# Patient Record
Sex: FEMALE
[De-identification: names, ages, dates, MRNs, and addresses within clinical notes are randomized per-mention and may not be internally consistent; named-entity substitution may affect disease eponyms.]

---

## 2023-04-17 ENCOUNTER — APPOINTMENT (OUTPATIENT)
Dept: OTOLARYNGOLOGY | Facility: CLINIC | Age: 33
End: 2023-04-17
Payer: COMMERCIAL

## 2023-04-17 VITALS — TEMPERATURE: 96.2 F | WEIGHT: 150 LBS | HEIGHT: 68 IN | BODY MASS INDEX: 22.73 KG/M2

## 2023-04-17 DIAGNOSIS — Z83.3 FAMILY HISTORY OF DIABETES MELLITUS: ICD-10-CM

## 2023-04-17 DIAGNOSIS — J31.0 CHRONIC RHINITIS: ICD-10-CM

## 2023-04-17 DIAGNOSIS — Z78.9 OTHER SPECIFIED HEALTH STATUS: ICD-10-CM

## 2023-04-17 DIAGNOSIS — Z80.3 FAMILY HISTORY OF MALIGNANT NEOPLASM OF BREAST: ICD-10-CM

## 2023-04-17 DIAGNOSIS — R09.81 NASAL CONGESTION: ICD-10-CM

## 2023-04-17 DIAGNOSIS — T48.5X5A CHRONIC RHINITIS: ICD-10-CM

## 2023-04-17 DIAGNOSIS — J34.3 HYPERTROPHY OF NASAL TURBINATES: ICD-10-CM

## 2023-04-17 PROCEDURE — 99204 OFFICE O/P NEW MOD 45 MIN: CPT | Mod: 25

## 2023-04-17 PROCEDURE — 31231 NASAL ENDOSCOPY DX: CPT

## 2023-04-18 NOTE — PHYSICAL EXAM
[Midline] : trachea located in midline position [Normal] : no rashes [Nasal Endoscopy Performed] : nasal endoscopy was performed, see procedure section for findings [de-identified] : + hypertrophied bilaterally

## 2023-04-18 NOTE — ASSESSMENT
[FreeTextEntry1] : 32 year old female presents with chronic nasal congestion and Afrin use x 3 years. Nasal endoscopy was limited, there was evidence of rhinitis medicamentosa with significant turbinate hypertrophy. Based on her history and exam findings, I am recommending stopping Afrin use and instead using nasal saline irrigation and nasal steroids for 3 months. Follow up at that time for repeat evaluation. Ultimately, patient may need turbinectomy in the future. \par \par - nasal saline irrigation and nasal steroids for 3 months \par - fu at that time for repeat evaluation \par - will likely need surgical intervention including turbinectomy

## 2023-04-18 NOTE — HISTORY OF PRESENT ILLNESS
[de-identified] : 4/17/23\par 32F presents with nasal congestion and use of Afrin daily (4 times a day) for the past 3 years. She complains of chronic nasal congestion that only improves with Afrin day. Denies changes in sense of taste or smell, drainage, facial pressure or dental pain. She's tried OTC nasal sprays including Flonase and other antihistamines. No other ENT issues.

## 2023-04-18 NOTE — PROCEDURE
[FreeTextEntry6] : -\par Nasal Endoscopy Procedure Note\par \par Pre-operative Diagnosis: nasal congestion, chronic Afrin use\par Post-operative Diagnosis: significant turbinate hypertrophy, rhinitis medicamentosa \par Anesthesia: Topical\par Procedure: Bilateral nasal endoscopy\par  \par Procedure Details: \par After topical anesthesia and decongestant, the patient was placed in the supine position. The telescope was passed along the left nasal floor to the nasopharynx. It was then passed into the region of the middle meatus, middle turbinate, and the sphenoethmoid region. An identical procedure was performed on the right side. \par  \par Findings: \par Mucosa: 	 + evidence of rhinitis medicamentosa \par Nasal septum: normal	\par Discharge: 	none	\par Turbinates: 	hypertrophied bilaterally \par Adenoid: 	 normal	\par Posterior choanae: 	normal	\par Eustachian tubes: 	normal	\par Mucous stranding: 	normal 	\par Lesions: 	 Not present	\par  \par Comments: \par Condition: Stable. Patient tolerated procedure well.\par

## 2023-04-19 RX ORDER — FLUTICASONE PROPIONATE 50 UG/1
50 SPRAY, METERED NASAL
Qty: 3 | Refills: 2 | Status: ACTIVE | COMMUNITY
Start: 2023-04-17 | End: 1900-01-01

## 2023-07-12 ENCOUNTER — APPOINTMENT (OUTPATIENT)
Dept: ORTHOPEDIC SURGERY | Facility: CLINIC | Age: 33
End: 2023-07-12
Payer: COMMERCIAL

## 2023-07-12 VITALS — WEIGHT: 150 LBS | BODY MASS INDEX: 22.73 KG/M2 | HEIGHT: 68 IN

## 2023-07-12 DIAGNOSIS — S16.1XXA STRAIN OF MUSCLE, FASCIA AND TENDON AT NECK LEVEL, INITIAL ENCOUNTER: ICD-10-CM

## 2023-07-12 PROCEDURE — 99204 OFFICE O/P NEW MOD 45 MIN: CPT

## 2023-07-12 PROCEDURE — 72040 X-RAY EXAM NECK SPINE 2-3 VW: CPT

## 2023-07-12 PROCEDURE — 72100 X-RAY EXAM L-S SPINE 2/3 VWS: CPT

## 2023-07-14 ENCOUNTER — FORM ENCOUNTER (OUTPATIENT)
Age: 33
End: 2023-07-14

## 2023-07-14 NOTE — PHYSICAL EXAM
[Normal Coordination] : normal coordination [Normal DTR UE/LE] : normal DTR UE/LE  [Normal Sensation] : normal sensation [Normal Mood and Affect] : normal mood and affect [Orientated] : orientated [Able to Communicate] : able to communicate [Normal Skin] : normal skin [No Rash] : no rash [No Ulcers] : no ulcers [No Lesions] : no lesions [No obvious lymphadenopathy in areas examined] : no obvious lymphadenopathy in areas examined [Well Developed] : well developed [Well Nourished] : well nourished [Peripheral vascular exam is grossly normal] : peripheral vascular exam is grossly normal [No Respiratory Distress] : no respiratory distress [Lungs clear to auscultation bilaterally] : lungs clear to auscultation bilaterally [Biceps 2+] : biceps 2+ [Triceps 2+] : triceps 2+ [Brachioradialis 2+] : brachioradialis 2+ [Flexion] : flexion [Extension] : extension [] : non-antalgic
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
## 2023-07-14 NOTE — DATA REVIEWED
[I independently reviewed and interpreted images and report] : I independently reviewed and interpreted images and report [I reviewed the films/CD and additionally noted] : I reviewed the films/CD and additionally noted [FreeTextEntry2] : in office x-rays cervical spine ap/lat demonstrates no fx. straightening of the normal lordosis consistent with spasms.  [FreeTextEntry1] : I stop paperwork reviewed

## 2023-07-15 ENCOUNTER — APPOINTMENT (OUTPATIENT)
Dept: MRI IMAGING | Facility: CLINIC | Age: 33
End: 2023-07-15
Payer: COMMERCIAL

## 2023-07-15 PROCEDURE — 72148 MRI LUMBAR SPINE W/O DYE: CPT

## 2023-07-19 ENCOUNTER — APPOINTMENT (OUTPATIENT)
Dept: ORTHOPEDIC SURGERY | Facility: CLINIC | Age: 33
End: 2023-07-19
Payer: COMMERCIAL

## 2023-07-19 VITALS — HEIGHT: 68 IN | BODY MASS INDEX: 22.73 KG/M2 | WEIGHT: 150 LBS

## 2023-07-19 DIAGNOSIS — M51.36 OTHER INTERVERTEBRAL DISC DEGENERATION, LUMBAR REGION: ICD-10-CM

## 2023-07-19 PROCEDURE — 99213 OFFICE O/P EST LOW 20 MIN: CPT | Mod: ACP
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
## 2023-07-19 NOTE — DISCUSSION/SUMMARY
[de-identified] : clinically I think she has a combination of discogenic pain and possibly some facetogenic Pain as she is more symptomatic with extension and rotation maneuvers \par \par discussed with the patient additional outpatient treatment in terms of oral anti-inflammatory medication’s outpatient physical therapy. Discussed possible spinal steroid injection in terms of epidural steroid injection, and possible facet injections. Patient would like to continue outpatient PT at this time and reevaluate in six weeks. She will make an appointment to follow up with interventional pain management as well to discuss possible spinal steroid injections.

## 2023-07-19 NOTE — DATA REVIEWED
[MRI] : MRI [I reviewed the films/CD and additionally noted] : I reviewed the films/CD and additionally noted [FreeTextEntry1] : [facility] MRI, lumbar spine, images and official report reviewed. L4/5 disc desiccation with signal change on T2, weighted images small central bulge. Mild facet, arthropathy bilaterally. Possible HIZ c/w annular tear ?? Suspect lumbar sacralization of the L5 vertebrae.

## 2023-09-12 ENCOUNTER — APPOINTMENT (OUTPATIENT)
Dept: PAIN MANAGEMENT | Facility: CLINIC | Age: 33
End: 2023-09-12

## 2024-06-19 ENCOUNTER — APPOINTMENT (OUTPATIENT)
Dept: PAIN MANAGEMENT | Facility: CLINIC | Age: 34
End: 2024-06-19
Payer: COMMERCIAL

## 2024-06-19 VITALS — HEIGHT: 68 IN | WEIGHT: 135 LBS | BODY MASS INDEX: 20.46 KG/M2

## 2024-06-19 DIAGNOSIS — M54.16 RADICULOPATHY, LUMBAR REGION: ICD-10-CM

## 2024-06-19 DIAGNOSIS — M47.816 SPONDYLOSIS W/OUT MYELOPATHY OR RADICULOPATHY, LUMBAR REGION: ICD-10-CM

## 2024-06-19 PROCEDURE — 99204 OFFICE O/P NEW MOD 45 MIN: CPT
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
## 2024-06-19 NOTE — ASSESSMENT
[FreeTextEntry1] : After discussing various treatment options with the patient including but not limited to oral medications, physical therapy, exercise, modalities as well as interventional spinal injections, we have decided with the following plan:  1) EMG/NCV has pretty extensive weakness in the b/l LE that I can not account for based on MRI.   2) The risks, benefits, contents and alternatives to injection were explained in full to the patient.  Risks outlined include but are not limited to infection,sepsis, bleeding, post-dural puncture headache, nerve damage, temporary increase in pain, syncopal episode, failure to resolve symptoms, allergic reaction, symptom recurrence, and elevation of blood sugar in diabetics. Cortisone may cause immunosuppression.  Patient understands the risks.  All questions were answered.  After discussion of options, patient requested an injection.  Information regarding the injection was given to the patient.  Which medications to stop prior to the injection was explained to the patient as well   Patient is presenting with acute/sub-acute radicular pain with impairment in ADLs and functionality.  The pain has not responded sufficiently to  conservative care including nsaid therapy and/or physical therapy.  There is no bleeding tendency, unstable medical condition, or systemic infection. The purpose of the spinal injections is to facilitate active therapy by providing short term relief through reduction of pain and inflammation.   Injections, by themselves, are not likely to provide long-term relief. Rather, active rehabilitation with modified work achieves long-term relief by increasing active ROM, strength and stability. Patient is presenting with acute/sub-acute radicular pain with impairment in ADLs and functionality.  The pain has not responded sufficiently to  conservative care including nsaid therapy and/or physical therapy.  There is no bleeding tendency, unstable medical condition, or systemic infection. The purpose of the spinal injections is to facilitate active therapy by providing short term relief through reduction of pain and inflammation.   Injections, by themselves, are not likely to provide long-term relief. Rather, active rehabilitation with modified work achieves long-term relief by increasing active ROM, strength and stability. LESI L4/5.

## 2024-06-19 NOTE — PHYSICAL EXAM
[] : lumbar paraspinal tenderness [NL (90)] : forward flexion 90 degrees [Extension] : extension [4___] : left extensor hallicus longus 4[unfilled]/5 [de-identified] : extension 10 degrees